# Patient Record
Sex: MALE | URBAN - METROPOLITAN AREA
[De-identification: names, ages, dates, MRNs, and addresses within clinical notes are randomized per-mention and may not be internally consistent; named-entity substitution may affect disease eponyms.]

---

## 2022-04-25 ENCOUNTER — OFFICE VISIT (OUTPATIENT)
Dept: URBAN - METROPOLITAN AREA CLINIC 35 | Facility: CLINIC | Age: 67
End: 2022-04-25
Payer: COMMERCIAL

## 2022-04-25 VITALS
BODY MASS INDEX: 23 KG/M2 | DIASTOLIC BLOOD PRESSURE: 82 MMHG | SYSTOLIC BLOOD PRESSURE: 128 MMHG | HEIGHT: 75 IN | OXYGEN SATURATION: 98 % | HEART RATE: 78 BPM | WEIGHT: 185 LBS

## 2022-04-25 DIAGNOSIS — Z86.010 PERSONAL HISTORY OF COLONIC POLYPS: ICD-10-CM

## 2022-04-25 DIAGNOSIS — R19.4 CHANGE IN BOWEL HABITS: ICD-10-CM

## 2022-04-25 DIAGNOSIS — R19.7 DIARRHEA, UNSPECIFIED TYPE: ICD-10-CM

## 2022-04-25 PROBLEM — 428283002: Status: ACTIVE | Noted: 2022-04-25

## 2022-04-25 PROCEDURE — 99204 OFFICE O/P NEW MOD 45 MIN: CPT | Performed by: PHYSICIAN ASSISTANT

## 2022-04-25 RX ORDER — SODIUM PICOSULFATE, MAGNESIUM OXIDE, AND ANHYDROUS CITRIC ACID 10; 3.5; 12 MG/160ML; G/160ML; G/160ML
160 ML LIQUID ORAL
Qty: 320 MILLILITER | Refills: 0 | OUTPATIENT
Start: 2022-04-25 | End: 2022-04-26

## 2022-04-25 RX ORDER — ESOMEPRAZOLE MAGNESIUM 20 MG/1
1 CAPSULE CAPSULE, DELAYED RELEASE ORAL ONCE A DAY
Status: ACTIVE | COMMUNITY

## 2022-04-25 RX ORDER — SODIUM SULFATE, POTASSIUM SULFATE, MAGNESIUM SULFATE 17.5; 3.13; 1.6 G/ML; G/ML; G/ML
AS DIRECTED SOLUTION, CONCENTRATE ORAL
Qty: 1 | Refills: 0 | Status: DISCONTINUED | COMMUNITY
Start: 2017-10-04

## 2022-04-25 RX ORDER — TAMSULOSIN HYDROCHLORIDE 0.4 MG/1
1 CAPSULE CAPSULE ORAL ONCE A DAY
Status: ACTIVE | COMMUNITY

## 2022-04-25 RX ORDER — NAPROXEN SODIUM 220 MG/1
1 TABLET AS NEEDED TABLET ORAL
Status: ACTIVE | COMMUNITY

## 2022-04-25 NOTE — HPI-SURVEILLANCE COLONOSCOPY
66year old male presents today for consultation for a surveillance colonoscopy. He has a personal history of colonic polyps and his last colonoscopy was completed on 11/27/2017 with Dr Servin results are noted below. He denies a family history of colon polyps and cancers. Currently, has 2 bowel movements per day. Patient states that the first bowel movement of the day is normal and formed and his second one of the day tends to be loose. He would like to discuss this. Stools are without blood, mucus or melena.    Last colonoscopy results:  -benign neoplasm of ascending colon, sigmoid colon and rectum -Hemorrhoids, second degree Path:Hyperplastic, tubular adenoma polyps

## 2022-04-25 NOTE — HPI-DIARRHEA
Patient was having some abdominal cramping and then loose stools.  He had an H. pylori test done, and stool studies and urinalysis done that were normal.  He states he is still having inconsistent looser stools. Typically his second BM of the day he will have the looser stools. No nocturnal diarrhea or weight loss.

## 2022-04-25 NOTE — HPI-HEARTBURN
Patient admits to occasional heartburn, diagnosed 3-4 years ago.  He was told by PCP, and was told he had GERD.  He was on Nexium 20mg for a month, but only takes it as needed and hasn't had an episode in 3-4 years.  Never had an EGD.

## 2022-06-17 ENCOUNTER — WEB ENCOUNTER (OUTPATIENT)
Dept: URBAN - METROPOLITAN AREA CLINIC 35 | Facility: CLINIC | Age: 67
End: 2022-06-17

## 2022-06-22 ENCOUNTER — WEB ENCOUNTER (OUTPATIENT)
Dept: URBAN - METROPOLITAN AREA CLINIC 35 | Facility: CLINIC | Age: 67
End: 2022-06-22

## 2022-06-30 ENCOUNTER — OFFICE VISIT (OUTPATIENT)
Dept: URBAN - METROPOLITAN AREA SURGERY CENTER 8 | Facility: SURGERY CENTER | Age: 67
End: 2022-06-30
Payer: COMMERCIAL

## 2022-06-30 ENCOUNTER — CLAIMS CREATED FROM THE CLAIM WINDOW (OUTPATIENT)
Dept: URBAN - METROPOLITAN AREA CLINIC 4 | Facility: CLINIC | Age: 67
End: 2022-06-30
Payer: COMMERCIAL

## 2022-06-30 DIAGNOSIS — K63.89 OTHER SPECIFIED DISEASES OF INTESTINE: ICD-10-CM

## 2022-06-30 DIAGNOSIS — K63.89 BACTERIAL OVERGROWTH SYNDROME: ICD-10-CM

## 2022-06-30 DIAGNOSIS — D12.2 ADENOMA OF ASCENDING COLON: ICD-10-CM

## 2022-06-30 DIAGNOSIS — R19.4 ALTERATION IN BOWEL ELIMINATION: ICD-10-CM

## 2022-06-30 DIAGNOSIS — D12.4 ADENOMA OF DESCENDING COLON: ICD-10-CM

## 2022-06-30 DIAGNOSIS — D12.3 BENIGN NEOPLASM OF TRANSVERSE COLON: ICD-10-CM

## 2022-06-30 DIAGNOSIS — D12.2 BENIGN NEOPLASM OF ASCENDING COLON: ICD-10-CM

## 2022-06-30 DIAGNOSIS — D12.3 ADENOMA OF TRANSVERSE COLON: ICD-10-CM

## 2022-06-30 DIAGNOSIS — D12.4 BENIGN NEOPLASM OF DESCENDING COLON: ICD-10-CM

## 2022-06-30 PROCEDURE — 45380 COLONOSCOPY AND BIOPSY: CPT | Performed by: INTERNAL MEDICINE

## 2022-06-30 PROCEDURE — G8907 PT DOC NO EVENTS ON DISCHARG: HCPCS | Performed by: INTERNAL MEDICINE

## 2022-06-30 PROCEDURE — 45385 COLONOSCOPY W/LESION REMOVAL: CPT | Performed by: INTERNAL MEDICINE

## 2022-06-30 PROCEDURE — 88305 TISSUE EXAM BY PATHOLOGIST: CPT | Performed by: PATHOLOGY

## 2022-07-14 ENCOUNTER — OFFICE VISIT (OUTPATIENT)
Dept: URBAN - METROPOLITAN AREA CLINIC 35 | Facility: CLINIC | Age: 67
End: 2022-07-14

## 2022-07-14 RX ORDER — ESOMEPRAZOLE MAGNESIUM 20 MG/1
1 CAPSULE CAPSULE, DELAYED RELEASE ORAL ONCE A DAY
COMMUNITY

## 2022-07-14 RX ORDER — NAPROXEN SODIUM 220 MG/1
1 TABLET AS NEEDED TABLET ORAL
COMMUNITY

## 2022-07-14 RX ORDER — TAMSULOSIN HYDROCHLORIDE 0.4 MG/1
1 CAPSULE CAPSULE ORAL ONCE A DAY
COMMUNITY

## 2022-07-14 NOTE — HPI-COLONOSCOPY FOLLOWUP
66 Year old male patient presents today for a follow up from his colonoscopy on 06/30/2022 with Dr. Servin. He admits/denies any complications after his procedure. He currently reports -- bowel movements per --, with/without strain. His stools are -- and --. He admits/denies any mucus, melena or blood in stools. Admits/Denies any pruritus ani or rectal pain.  Colonoscopy Findings (06/30/2022):  - The perianal and digital rectal examinations were normal.  Pertinent negatives include normal sphincter tone, no palpable rectal lesions and normal prostate (size, shape, and consistency). - Many small-mouthed diverticula were found in the sigmoid colon and descending colon. - A 10 mm polyp was found in the ascending colon.  The polyp was sessile.  The polyp was removed with a cold snare.  Resection and retrieval were complete.  Estimated blood loss was minimal. - A 2 mm polyp was found in the hepatic flexure.  The polyp was sessile.  The polyp was removed with a cold biopsy forceps.  Resection and retrieval were complete.  Estimated blood loss was minimal. - A 5 mm polyp was found in the splenic flexure.  The polyp was sessile.  The polyp was removed with a cold snare.  Resection and retrieval were complete.  Estimated blood loss was minimal. - A 3 mm polyp was found in the descending colon.  The polyp was sessile.  The polyp was removed with a cold biopsy forceps.  Resection and retrieval were complete.  Estimated blood loss was minimal. - Five sessile polyps were found in the rectum.  The polyps were 1 to 3 mm in size.  These polyps were removed with a cold biopsy forceps.  Resection and retrieval were complete.  Estimated blood loss was minimal. - Non-bleeding internal hemorrhoids were found during retroflexion.  The hemorrhoids were large and Grade II (internal hemorrhoids that prolapse but reduce spontaneously).

## 2022-07-14 NOTE — HPI-HEARTBURN
Patient admits or denies any improvements in heartburn symptoms. Patient states??.  Last Visit (4/25/22) Patient admits to occasional heartburn, diagnosed 3-4 years ago.  He was told by PCP, and was told he had GERD.  He was on Nexium 20mg for a month, but only takes it as needed and hasn't had an episode in 3-4 years.  Never had an EGD.

## 2022-07-14 NOTE — HPI-DIARRHEA
Patient admits or denies any improvements in diarrhea symptoms. Patient states??.  Last Visit (4/25/22) Patient was having some abdominal cramping and then loose stools.  He had an H. pylori test done, and stool studies and urinalysis done that were normal.  He states he is still having inconsistent looser stools. Typically his second BM of the day he will have the looser stools. No nocturnal diarrhea or weight loss.

## 2022-07-25 ENCOUNTER — OFFICE VISIT (OUTPATIENT)
Dept: URBAN - METROPOLITAN AREA CLINIC 35 | Facility: CLINIC | Age: 67
End: 2022-07-25

## 2022-07-25 RX ORDER — TAMSULOSIN HYDROCHLORIDE 0.4 MG/1
1 CAPSULE CAPSULE ORAL ONCE A DAY
COMMUNITY

## 2022-07-25 RX ORDER — NAPROXEN SODIUM 220 MG/1
1 TABLET AS NEEDED TABLET ORAL
COMMUNITY

## 2022-07-25 RX ORDER — ESOMEPRAZOLE MAGNESIUM 20 MG/1
1 CAPSULE CAPSULE, DELAYED RELEASE ORAL ONCE A DAY
COMMUNITY

## 2022-07-25 NOTE — HPI-COLONOSCOPY FOLLOWUP
66 Year old male patient presents today for a follow up from his colonoscopy on 06/30/2022 with Dr. Servin. He admits/denies any complications after his procedure. Hecurrently reports -- bowel movements per --, with/without strain. His stools are -- and --. He admits/denies any mucus, melena or blood in stools. Admits/Denies any pruritus ani or rectal pain.  Colonoscopy Findings (06/30/2022):  - The perianal and digital rectal examinations were normal.  Pertinent negatives include normal sphincter tone, no palpable rectal lesions and normal prostate (size, shape, and consistency). - Many small-mouthed diverticula were found in the sigmoid colon and descending colon. - A 10 mm polyp was found in the ascending colon.  The polyp was sessile.  The polyp was removed with a cold snare.  Resection and retrieval were complete.  Estimated blood loss was minimal. - A 2 mm polyp was found in the hepatic flexure.  The polyp was sessile.  The polyp was removed with a cold biopsy forceps.  Resection and retrieval were complete.  Estimated blood loss was minimal. - A 5 mm polyp was found in the splenic flexure.  The polyp was sessile.  The polyp was removed with a cold snare.  Resection and retrieval were complete.  Estimated blood loss was minimal. - A 3 mm polyp was found in the descending colon.  The polyp was sessile.  The polyp was removed with a cold biopsy forceps.  Resection and retrieval were complete.  Estimated blood loss was minimal. - Five sessile polyps were found in the rectum.  The polyps were 1 to 3 mm in size.  These polyps were removed with a cold biopsy forceps.  Resection and retrieval were complete.  Estimated blood loss was minimal. - Non-bleeding internal hemorrhoids were found during retroflexion.  The hemorrhoids were large and Grade II (internal hemorrhoids that prolapse but reduce spontaneously).  Pathology:  (A) Colon, Descending, Polypectomy: TUBULAR ADENOMA(S). (B) Colon, Ascending, Proximal, Polypectomy: TUBULAR ADENOMA(S). (C) Colon, Hepatic Flexure, Polypectomy: TUBULAR ADENOMA(S). (D) Colon, Splenic Flexure, Polypectomy: PROMINENT MUCOSAL LYMPHOID AGGREGATES.  Negative for Dysplasia or Malignancy. (E) Rectum, Polypectomy: HYPERPLASTIC POLYP(S).

## 2022-07-27 ENCOUNTER — OFFICE VISIT (OUTPATIENT)
Dept: URBAN - METROPOLITAN AREA CLINIC 35 | Facility: CLINIC | Age: 67
End: 2022-07-27
Payer: COMMERCIAL

## 2022-07-27 ENCOUNTER — DASHBOARD ENCOUNTERS (OUTPATIENT)
Age: 67
End: 2022-07-27

## 2022-07-27 VITALS
BODY MASS INDEX: 21.88 KG/M2 | HEIGHT: 75 IN | SYSTOLIC BLOOD PRESSURE: 124 MMHG | OXYGEN SATURATION: 99 % | DIASTOLIC BLOOD PRESSURE: 76 MMHG | WEIGHT: 176 LBS | HEART RATE: 63 BPM

## 2022-07-27 DIAGNOSIS — K59.1 FUNCTIONAL DIARRHEA: ICD-10-CM

## 2022-07-27 DIAGNOSIS — D12.2 ADENOMA OF ASCENDING COLON: ICD-10-CM

## 2022-07-27 DIAGNOSIS — D12.4 BENIGN NEOPLASM OF DESCENDING COLON: ICD-10-CM

## 2022-07-27 DIAGNOSIS — D12.6 BENIGN NEOPLASM OF COLON, UNSPECIFIED PART OF COLON: ICD-10-CM

## 2022-07-27 DIAGNOSIS — D12.8 BENIGN NEOPLASM OF RECTUM: ICD-10-CM

## 2022-07-27 DIAGNOSIS — R12 HEARTBURN: ICD-10-CM

## 2022-07-27 DIAGNOSIS — K64.1 GRADE II HEMORRHOIDS: ICD-10-CM

## 2022-07-27 DIAGNOSIS — K57.90 DIVERTICULOSIS: ICD-10-CM

## 2022-07-27 PROBLEM — 397881000: Status: ACTIVE | Noted: 2022-07-27

## 2022-07-27 PROCEDURE — 99214 OFFICE O/P EST MOD 30 MIN: CPT | Performed by: PHYSICIAN ASSISTANT

## 2022-07-27 RX ORDER — ESOMEPRAZOLE MAGNESIUM 20 MG/1
1 CAPSULE CAPSULE, DELAYED RELEASE ORAL ONCE A DAY
Status: ACTIVE | COMMUNITY

## 2022-07-27 RX ORDER — NAPROXEN SODIUM 220 MG/1
1 TABLET AS NEEDED TABLET ORAL
Status: ACTIVE | COMMUNITY

## 2022-07-27 RX ORDER — TAMSULOSIN HYDROCHLORIDE 0.4 MG/1
1 CAPSULE CAPSULE ORAL ONCE A DAY
Status: ACTIVE | COMMUNITY

## 2022-07-27 NOTE — HPI-DIARRHEA
Patient admits improvements in diarrhea symptoms. Patient states having 1 episode of diarrhea since last visit.   Last Visit (4/25/22) Patient was having some abdominal cramping and then loose stools.  He had an H. pylori test done, and stool studies and urinalysis done that were normal.  He states he is still having inconsistent looser stools. Typically, his second BM of the day he will have the looser stools. No nocturnal diarrhea or weight loss.

## 2022-07-27 NOTE — HPI-HEARTBURN
Patient admits improvements in heartburn symptoms. Patient states drinking more water every day which has helped with heartburn symptoms. Patient states not continued use of Nexium 20 mg but will take it when severe symptoms occur.   Last Visit (4/25/22) Patient admits to occasional heartburn, diagnosed 3-4 years ago.  He was told by PCP, and was told he had GERD.  He was on Nexium 20 mg for a month, but only takes it as needed and hasn't had an episode in 3-4 years.  Never had an EGD.

## 2022-07-27 NOTE — HPI-COLONOSCOPY FOLLOWUP
66 Year old male patient presents today for a follow up from his colonoscopy. He denies any complications after his procedure. He currently reports 1-2 bowel movements per day, without strain. His stools are soft and formed. He denies any mucus, melena or blood in stools. Denies any pruritus ani or rectal pain.   Colonoscopy Findings -The perianal and digital rectal examinations were normal.  Pertinent negatives include normal sphincter tone, no palpable rectal lesions and normal prostate (size, shape, and consistency). - Many small-mouthed diverticula were found in the sigmoid colon and descending colon. - A 10 mm polyp was found in the ascending colon.  The polyp was sessile.  The polyp was removed with a cold snare.  Resection and retrieval were complete.  Estimated blood loss was minimal. - A 2 mm polyp was found in the hepatic flexure.  The polyp was sessile.  The polyp was removed with a cold biopsy forceps.  Resection and retrieval were complete.  Estimated blood loss was minimal. - A 5 mm polyp was found in the splenic flexure.  The polyp was sessile.  The polyp was removed with a cold snare.  Resection and retrieval were complete.  Estimated blood loss was minimal. - A 3 mm polyp was found in the descending colon.  The polyp was sessile.  The polyp was removed with a cold biopsy forceps.  Resection and retrieval were complete.  Estimated blood loss was minimal. - Five sessile polyps were found in the rectum.  The polyps were 1 to 3 mm in size.  These polyps were removed with a cold biopsy forceps.  Resection and retrieval were complete.  Estimated blood loss was minimal. - Non-bleeding internal hemorrhoids were found during retroflexion.  The hemorrhoids were large and Grade II (internal hemorrhoids that prolapse but reduce spontaneously).  Pathology:  (A) Colon, Descending, Polypectomy: TUBULAR ADENOMA(S). (B) Colon, Ascending, Proximal, Polypectomy: TUBULAR ADENOMA(S). (C) Colon, Hepatic Flexure, Polypectomy: TUBULAR ADENOMA(S). (D) Colon, Splenic Flexure, Polypectomy: PROMINENT MUCOSAL LYMPHOID AGGREGATES.  Negative for Dysplasia or Malignancy. (E) Rectum, Polypectomy: HYPERPLASTIC POLYP(S).

## 2025-02-04 ENCOUNTER — OFFICE VISIT (OUTPATIENT)
Dept: URBAN - METROPOLITAN AREA CLINIC 35 | Facility: CLINIC | Age: 70
End: 2025-02-04
Payer: COMMERCIAL

## 2025-02-04 ENCOUNTER — TELEPHONE ENCOUNTER (OUTPATIENT)
Dept: URBAN - METROPOLITAN AREA CLINIC 82 | Facility: CLINIC | Age: 70
End: 2025-02-04

## 2025-02-04 VITALS
DIASTOLIC BLOOD PRESSURE: 72 MMHG | SYSTOLIC BLOOD PRESSURE: 124 MMHG | OXYGEN SATURATION: 98 % | HEART RATE: 73 BPM | BODY MASS INDEX: 21.88 KG/M2 | WEIGHT: 176 LBS | HEIGHT: 75 IN

## 2025-02-04 DIAGNOSIS — K22.4 ESOPHAGEAL SPASM: ICD-10-CM

## 2025-02-04 DIAGNOSIS — R12 HEARTBURN: ICD-10-CM

## 2025-02-04 DIAGNOSIS — R09.A2 GLOBUS PHARYNGEUS: ICD-10-CM

## 2025-02-04 DIAGNOSIS — R13.14 PHARYNGOESOPHAGEAL DYSPHAGIA: ICD-10-CM

## 2025-02-04 PROCEDURE — 99214 OFFICE O/P EST MOD 30 MIN: CPT | Performed by: INTERNAL MEDICINE

## 2025-02-04 RX ORDER — SIMETHICONE 180 MG
1 CAPSULE AFTER MEALS AND AT BEDTIME AS NEEDED CAPSULE ORAL TWICE A DAY
Status: ON HOLD | COMMUNITY

## 2025-02-04 RX ORDER — TAMSULOSIN HYDROCHLORIDE 0.4 MG/1
1 CAPSULE CAPSULE ORAL ONCE A DAY
Status: ON HOLD | COMMUNITY

## 2025-02-04 RX ORDER — NAPROXEN SODIUM 220 MG
1 TABLET AS NEEDED CAPSULE ORAL
Status: ACTIVE | COMMUNITY

## 2025-02-04 RX ORDER — ESOMEPRAZOLE MAGNESIUM 20 MG/1
1 CAPSULE CAPSULE, DELAYED RELEASE ORAL ONCE A DAY
Status: ON HOLD | COMMUNITY

## 2025-02-04 RX ORDER — ONDANSETRON 8 MG/1
1 TABLET ON THE TONGUE AND ALLOW TO DISSOLVE  AS NEEDED TABLET, ORALLY DISINTEGRATING ORAL ONCE A DAY
Status: ON HOLD | COMMUNITY

## 2025-02-04 RX ORDER — FAMOTIDINE 20 MG/1
1 TABLET AT BEDTIME AS NEEDED TABLET, FILM COATED ORAL ONCE A DAY
Status: ACTIVE | COMMUNITY

## 2025-02-04 NOTE — HPI-HEARTBURN
Patient presents for heartburn. Patient states he has  experience his last symptoms since 12.06.2024. Patient admits currently taking Famotidine prescribed medications for relief of symptoms. Patient describes his last  symptoms as chest discomfort and chest tightness.  He also states symptoms of  a dry cough and dizziness. He states they are most present during the morning. Patient denies nighttime symptoms. Patient denies nausea or vomiting. Patient  denies any associated weight loss. Patient denies EGD in the past.   Most recent lab completed on 10.24.2024, PCP Miri Felix. CMP,Lipid Panel, CBC, PSA,TSH,Vitamin B12,Viatmin D,25 Hydroxy all within normal range.   (Last visit) 07/27/2022) Patient admits improvements in heartburn symptoms. Patient states drinking more water every day which has helped with heartburn symptoms. Patient states not continued use of Nexium 20 mg but will take it when severe symptoms occur.   Last Visit (4/25/22) Patient admits to occasional heartburn, diagnosed 3-4 years ago.  He was told by PCP, and was told he had GERD.  He was on Nexium 20 mg for a month, but only takes it as needed and hasn't had an episode in 3-4 years.  Never had an EGD.

## 2025-02-04 NOTE — HPI-DYSPHAGIA
69year old male patient presents today for consultation for dysphagia, ongoing for several years. His last episode was last summer of 2024. He admits trouble swallowing during his meal at the time he had episode of dysphagia .  Patient admits forced regurgitation.  He states he felt like he had a "bubble" in his esophagus and a dry cough. He just could not clear his esophagus.  He admits SOB at the time he was having episodes of dyspagia.    Patient was having some dysphagia, barium swallow showed esophageal spasm per PCP. Patient is symptomatic and he has GERD.  Episodes 4 times a year

## 2025-02-19 ENCOUNTER — CLAIMS CREATED FROM THE CLAIM WINDOW (OUTPATIENT)
Dept: URBAN - METROPOLITAN AREA SURGERY CENTER 8 | Facility: SURGERY CENTER | Age: 70
End: 2025-02-19
Payer: COMMERCIAL

## 2025-02-19 ENCOUNTER — CLAIMS CREATED FROM THE CLAIM WINDOW (OUTPATIENT)
Dept: URBAN - METROPOLITAN AREA CLINIC 4 | Facility: CLINIC | Age: 70
End: 2025-02-19
Payer: COMMERCIAL

## 2025-02-19 DIAGNOSIS — K22.4 ESOPHAGEAL DYSMOTILITY: ICD-10-CM

## 2025-02-19 DIAGNOSIS — K31.89 OTHER DISEASES OF STOMACH AND DUODENUM: ICD-10-CM

## 2025-02-19 DIAGNOSIS — K29.70 GASTRITIS, UNSPECIFIED, WITHOUT BLEEDING: ICD-10-CM

## 2025-02-19 DIAGNOSIS — K21.9 GASTRO - ESOPHAGEAL REFLUX DISEASE: ICD-10-CM

## 2025-02-19 DIAGNOSIS — K29.70 REACTIVE GASTROPATHY: ICD-10-CM

## 2025-02-19 DIAGNOSIS — K21.9 ACID REFLUX: ICD-10-CM

## 2025-02-19 DIAGNOSIS — K21.9 GASTRO-ESOPHAGEAL REFLUX DISEASE WITHOUT ESOPHAGITIS: ICD-10-CM

## 2025-02-19 DIAGNOSIS — R13.19 OTHER DYSPHAGIA: ICD-10-CM

## 2025-02-19 PROCEDURE — 43239 EGD BIOPSY SINGLE/MULTIPLE: CPT | Performed by: INTERNAL MEDICINE

## 2025-02-19 PROCEDURE — 88312 SPECIAL STAINS GROUP 1: CPT | Performed by: PATHOLOGY

## 2025-02-19 PROCEDURE — 00731 ANES UPR GI NDSC PX NOS: CPT | Performed by: NURSE ANESTHETIST, CERTIFIED REGISTERED

## 2025-02-19 PROCEDURE — 43248 EGD GUIDE WIRE INSERTION: CPT | Performed by: INTERNAL MEDICINE

## 2025-02-19 PROCEDURE — 88305 TISSUE EXAM BY PATHOLOGIST: CPT | Performed by: PATHOLOGY

## 2025-02-19 RX ORDER — NAPROXEN SODIUM 220 MG
1 TABLET AS NEEDED CAPSULE ORAL
Status: ACTIVE | COMMUNITY

## 2025-02-19 RX ORDER — ESOMEPRAZOLE MAGNESIUM 20 MG/1
1 CAPSULE CAPSULE, DELAYED RELEASE ORAL ONCE A DAY
Status: ON HOLD | COMMUNITY

## 2025-02-19 RX ORDER — SIMETHICONE 180 MG
1 CAPSULE AFTER MEALS AND AT BEDTIME AS NEEDED CAPSULE ORAL TWICE A DAY
Status: ON HOLD | COMMUNITY

## 2025-02-19 RX ORDER — TAMSULOSIN HYDROCHLORIDE 0.4 MG/1
1 CAPSULE CAPSULE ORAL ONCE A DAY
Status: ON HOLD | COMMUNITY

## 2025-02-19 RX ORDER — ONDANSETRON 8 MG/1
1 TABLET ON THE TONGUE AND ALLOW TO DISSOLVE  AS NEEDED TABLET, ORALLY DISINTEGRATING ORAL ONCE A DAY
Status: ON HOLD | COMMUNITY

## 2025-02-19 RX ORDER — FAMOTIDINE 20 MG/1
1 TABLET AT BEDTIME AS NEEDED TABLET, FILM COATED ORAL ONCE A DAY
Status: ACTIVE | COMMUNITY

## 2025-02-20 PROBLEM — 267103008: Status: ACTIVE | Noted: 2025-02-20

## 2025-02-20 PROBLEM — 40739000: Status: ACTIVE | Noted: 2025-02-20

## 2025-02-20 PROBLEM — 16331000: Status: ACTIVE | Noted: 2025-02-20

## 2025-03-18 ENCOUNTER — OFFICE VISIT (OUTPATIENT)
Dept: URBAN - METROPOLITAN AREA CLINIC 35 | Facility: CLINIC | Age: 70
End: 2025-03-18

## 2025-03-18 ENCOUNTER — TELEPHONE ENCOUNTER (OUTPATIENT)
Dept: URBAN - METROPOLITAN AREA CLINIC 97 | Facility: CLINIC | Age: 70
End: 2025-03-18

## 2025-03-18 VITALS
OXYGEN SATURATION: 98 % | HEART RATE: 72 BPM | BODY MASS INDEX: 21.64 KG/M2 | SYSTOLIC BLOOD PRESSURE: 126 MMHG | HEIGHT: 75 IN | DIASTOLIC BLOOD PRESSURE: 78 MMHG | WEIGHT: 174 LBS

## 2025-03-18 PROBLEM — 428283002: Status: ACTIVE | Noted: 2025-03-18

## 2025-03-18 RX ORDER — FAMOTIDINE 20 MG/1
1 TABLET AT BEDTIME AS NEEDED TABLET, FILM COATED ORAL ONCE A DAY
Status: ACTIVE | COMMUNITY

## 2025-03-18 RX ORDER — NAPROXEN SODIUM 220 MG
1 TABLET AS NEEDED CAPSULE ORAL
Status: ACTIVE | COMMUNITY

## 2025-03-18 RX ORDER — ESOMEPRAZOLE MAGNESIUM 20 MG/1
1 CAPSULE CAPSULE, DELAYED RELEASE ORAL ONCE A DAY
Status: ON HOLD | COMMUNITY

## 2025-03-18 RX ORDER — TAMSULOSIN HYDROCHLORIDE 0.4 MG/1
1 CAPSULE CAPSULE ORAL ONCE A DAY
Status: ON HOLD | COMMUNITY

## 2025-03-18 RX ORDER — ONDANSETRON 8 MG/1
1 TABLET ON THE TONGUE AND ALLOW TO DISSOLVE  AS NEEDED TABLET, ORALLY DISINTEGRATING ORAL ONCE A DAY
Status: ON HOLD | COMMUNITY

## 2025-03-18 RX ORDER — SIMETHICONE 180 MG
1 CAPSULE AFTER MEALS AND AT BEDTIME AS NEEDED CAPSULE ORAL TWICE A DAY
Status: ON HOLD | COMMUNITY

## 2025-03-18 NOTE — HPI-HEARTBURN
Patient presents for follow up for  heartburn. Patient admits currently taking Famotidine prescribed medications for relief of symptoms. Patient states he does improvement with his heartburn symptoms.  Patient denies nighttime symptoms. Patient admits nausea or vomiting. Patient  denies any associated weight loss. Patient did have an EGD with [facility] on 02.19.25.; results in EMR.  Last Visit 02.04.2025) Patient presents for heartburn. Patient states he has  experienced his last symptoms since 12.06.2024. Patient admits currently taking Famotidine prescribed medications for relief of symptoms. Patient describes his last  symptoms as chest discomfort and chest tightness.  He also states symptoms of  a dry cough and dizziness. He states they are most present during the morning. Patient denies nighttime symptoms. Patient admits nausea in the morning, but it goes away after he has his morning bowel movement. Patient  denies any associated weight loss. Patient denies EGD in the past.   Most recent lab completed on 10.24.2024, PCP Miri Felix. CMP,Lipid Panel, CBC, PSA,TSH,Vitamin B12,Viatmin D,25 Hydroxy all within normal range.   (Last visit) 07/27/2022) Patient admits improvements in heartburn symptoms. Patient states drinking more water every day which has helped with heartburn symptoms. Patient states not continued use of Nexium 20 mg but will take it when severe symptoms occur.   Last Visit (4/25/22) Patient admits to occasional heartburn, diagnosed 3-4 years ago.  He was told by PCP, and was told he had GERD.  He was on Nexium 20 mg for a month, but only takes it as needed and hasn't had an episode in 3-4 years.  Never had an EGD.

## 2025-03-18 NOTE — HPI-ENDOSCOPY (EGD) FOLLOWUP
Patient presents today for follow up to his EGD with [facility] on 02.19.2025.  Since the procedure patient denies dysphagia, Globus, changes in appetite, and changes in bowel habits.

## 2025-03-18 NOTE — HPI-DYSPHAGIA
69year old male patient presents today for follow-up dysphagia.  He denies trouble swallowing and has not had  any episode of dysphagia.  Patient denies forced regurgitation.  He denies SOB since his procedure.  Patient states he has not had the following symptoms of chest discomfort, congestion in his esophagus, and he no longer has the dry cough.   (Last Visit 02.04.2025) 69year old male patient presents today for consultation for dysphagia, ongoing for several years. His last episode was last summer of 2024. He admits trouble swallowing during his meal at the time he had episode of dysphagia.  Patient admits forced regurgitation.  He states he felt like he had a "bubble" in his esophagus and a dry cough. He just could not clear his esophagus.  He admits SOB at the time he was having episodes of dyspagia.    Patient was having some dysphagia, barium swallow showed esophageal spasm per PCP. Patient is symptomatic and he has GERD.  Episodes 4 times a year